# Patient Record
Sex: FEMALE | Race: OTHER | Employment: OTHER | URBAN - METROPOLITAN AREA
[De-identification: names, ages, dates, MRNs, and addresses within clinical notes are randomized per-mention and may not be internally consistent; named-entity substitution may affect disease eponyms.]

---

## 2018-10-29 NOTE — PATIENT DISCUSSION
The patient elects Basic OS, goal of emmetropia; the patient understands and accepts need for prescription spectacles at all focal points for best vision.

## 2018-10-29 NOTE — PATIENT DISCUSSION
The patient is pleased with the results of cataract surgery in the first eye and wishes to proceed with cataract surgery in the fellow eye. Again, the risks, benefits and alternatives to cataract surgery were reviewed in detail.

## 2018-10-29 NOTE — PATIENT DISCUSSION
Patient advised of the right to post-operative care by the surgeon. Patient is fully informed of, and agreed to, co-management with their primary optometric physician. Post-operative care by the surgeon is not medically necessary and co-management is clinically appropriate. Patient has received itemization of fees related to cataract surgery. Transfer of care letter completed for the patient. Transfer care of left eye to Dr. Vitaliy Rodrigues on 10-. Patient instructed to call immediately if any new distortion, blurring, decreased vision or eye pain.

## 2021-07-29 ENCOUNTER — NEW PATIENT EMERGENCY (OUTPATIENT)
Dept: URBAN - METROPOLITAN AREA CLINIC 40 | Facility: CLINIC | Age: 66
End: 2021-07-29

## 2021-07-29 DIAGNOSIS — H43.812: ICD-10-CM

## 2021-07-29 PROCEDURE — 92002 INTRM OPH EXAM NEW PATIENT: CPT

## 2021-07-29 ASSESSMENT — VISUAL ACUITY
OS_CC: 20/25
OD_CC: 20/25
OU_CC: 20/20-1

## 2021-07-29 ASSESSMENT — TONOMETRY
OD_IOP_MMHG: 19
OS_IOP_MMHG: 19

## 2022-06-02 ENCOUNTER — ESTABLISHED PATIENT (OUTPATIENT)
Dept: URBAN - METROPOLITAN AREA CLINIC 40 | Facility: CLINIC | Age: 67
End: 2022-06-02

## 2022-06-02 DIAGNOSIS — H43.812: ICD-10-CM

## 2022-06-02 DIAGNOSIS — H04.123: ICD-10-CM

## 2022-06-02 DIAGNOSIS — H52.03: ICD-10-CM

## 2022-06-02 PROCEDURE — 92015 DETERMINE REFRACTIVE STATE: CPT

## 2022-06-02 PROCEDURE — 92014 COMPRE OPH EXAM EST PT 1/>: CPT

## 2022-06-02 ASSESSMENT — VISUAL ACUITY
OD_SC: J4
OS_SC: J6
OS_CC: J2
OD_CC: J1
OS_SC: 20/25-2
OD_SC: 20/20-1
OS_CC: 20/30-1
OD_CC: 20/30-1

## 2022-06-02 ASSESSMENT — TONOMETRY
OS_IOP_MMHG: 16
OD_IOP_MMHG: 17

## 2023-03-02 NOTE — PATIENT DISCUSSION
Patient had consult appointment yesterday. Called patient to let them know next steps would be to have a CT scan to determine if anatomy is suitable for a device. Patient stated they would like to move forward with scheduling this and instructed that they would call with in the next week to schedule this. Routed to scheduling. Gave direct number for call back with any questions. -SC      ----- Message -----  From: Balbina Rodriguez PA-C  Sent: 3/1/2023   3:39 PM CST  To: [facility] Left Atrial Appendage Closure Pool - e    He wants to move forward with implant.      Since kidney function ok, would order CT pulm vein for screening    Balbina     The types of intraocular lenses were reviewed with the patient along with a discussion of their various strengths and weaknesses.

## 2023-11-14 ENCOUNTER — ESTABLISHED PATIENT (OUTPATIENT)
Dept: URBAN - METROPOLITAN AREA CLINIC 40 | Facility: CLINIC | Age: 68
End: 2023-11-14

## 2023-11-14 DIAGNOSIS — H43.812: ICD-10-CM

## 2023-11-14 DIAGNOSIS — E05.00: ICD-10-CM

## 2023-11-14 DIAGNOSIS — H52.03: ICD-10-CM

## 2023-11-14 DIAGNOSIS — H04.123: ICD-10-CM

## 2023-11-14 PROCEDURE — 92014 COMPRE OPH EXAM EST PT 1/>: CPT

## 2023-11-14 PROCEDURE — 92250E RETINAL SCREENING, ELECTIVE

## 2023-11-14 PROCEDURE — 92015 DETERMINE REFRACTIVE STATE: CPT

## 2023-11-14 ASSESSMENT — TONOMETRY
OS_IOP_MMHG: 16
OD_IOP_MMHG: 16

## 2023-11-14 ASSESSMENT — VISUAL ACUITY
OS_SC: J3
OD_SC: J4
OU_SC: J3
OU_SC: 20/25
OS_SC: 20/25
OD_SC: 20/30-2

## 2024-05-14 NOTE — PATIENT DISCUSSION
Amvisc OU, Same day PO, Cyclogyl, LensAR IA Only for CV/AV.
Monitor.
Patient is a candidate for all options.
Patient understands there is an increased risk of corneal edema after cataract surgery.
Post op gtt instructions reviewed with patient.
Reassess for Basic OS, goal of emmetropia.
Recommended artificial tears to use: 1 drop 4x a day in both eyes.
The patient feels that the cataract is significantly impacting daily activities and has elected cataract surgery. The risks, benefits, and alternatives to surgery were discussed. The patient elects to proceed with surgery.
The patient was informed that with the Basic option, they will most likely need prescription glasses at all focal points after surgery. The patient elects Basic OD, goal of emmetropia.
The types of intraocular lenses were reviewed with the patient along with a discussion of their various strengths and weaknesses.
sent from [facility] for generalized weakness with decrease in PO intake, taking cephalexin for UTI.